# Patient Record
(demographics unavailable — no encounter records)

---

## 2025-01-28 NOTE — REASON FOR VISIT
[TextEntry] : 37-year-old female who presents for urinary frequency and stress urinary incontinence  Patient reports that the stress urinary incontinence began after her second vaginal delivery.  She has leakage with coughing and sneezing.  She also reports urinary frequency every 30 to 60 minutes.  She does hydrate during the day but feels this is too frequent.  She has no nocturia.  She endorses pressure and discomfort prompting her to void.  She denies urgency and urge incontinence.  She denies straining to empty her bladder.  She denies gross hematuria.  She denies any urologic history.  She does occasionally get a right flank pain and is interested in an ultrasound.  She also reports that she has had 2 UTIs since her deliveries.  Her symptoms include dysuria, frequency urgency and sensation of difficulty emptying.  She is a history of 2 vaginal deliveries with a forceps for the first.  She denies other pelvic surgeries.  She denies vaginal bulge.  She denies dryness.  She is not on birth control.  She has regular menses.  She reports constipation that is currently managed with metformin.  She was taking senna as needed previously.  If she is not on anything, she will have bowel movements once per week.  She is on metformin for weight loss.  She denies diabetes She denies other health issues PVR minimal  Outside records reviewed: A1c 5.4% Creatinine 0.6 Urinalysis 0 RBC

## 2025-01-28 NOTE — PHYSICAL EXAM
[Normal Appearance] : normal appearance [Well Groomed] : well groomed [General Appearance - In No Acute Distress] : no acute distress [Abdomen Soft] : soft [Abdomen Tenderness] : non-tender [Normal Station and Gait] : the gait and station were normal for the patient's age [] : no rash [No Focal Deficits] : no focal deficits [Oriented To Time, Place, And Person] : oriented to person, place, and time [Affect] : the affect was normal [Mood] : the mood was normal [de-identified] : +urethral hypermobility, but negative CST supine and standing. no prolapse, nontender pelvic floor [Chaperone Present] : A chaperone was present in the examining room during all aspects of the physical examination [[id number]] : A chaperone was present during the pelvic exam. [FreeTextEntry2] : [facility]

## 2025-01-28 NOTE — ASSESSMENT
[FreeTextEntry1] : 37-year-old female who presents for:  1.  Stress urinary incontinence-patient did not exhibit stress incontinence on exam today.  Discussed that we will repeat this at upcoming visits with her bladder full.  I did offer to fill retrograde with a catheter but she declined.  Discussed options including pelvic floor PT, Bulkamid, mid urethral sling.  At this point she wants to try pelvic floor PT and she was given information regarding the Bulkamid as well as sling.  2.  OAB-patient with urinary frequency and pressure prompting her to void.  She does not have incontinence.  Discussed avoiding bladder irritants, pelvic floor PT, OAB meds.  At this time she wants to try pelvic floor PT for bladder retraining.  3.  Intermittent right flank pain-patient intermittently develops right flank pain.  She is interested in going for renal bladder ultrasound.  This has been sent for her.  4.  UTIs-patient with a history of 2 UTIs since her delivery.  Will send her urine today for urinalysis and urine culture.  Follow-up urine studies Follow-up ultrasound Return to clinic in 2 to 3 months; plan for repeat pelvic exam with a full bladder

## 2025-03-24 NOTE — REASON FOR VISIT
[TextEntry] : 37-year-old female who presents for urinary frequency and stress urinary incontinence  Patient reports that the stress urinary incontinence began after her second vaginal delivery.  She has leakage with coughing and sneezing.  She also reports urinary frequency every 30 to 60 minutes.  She does hydrate during the day but feels this is too frequent.  She has no nocturia.  She endorses pressure and discomfort prompting her to void.  She denies urgency and urge incontinence.  She denies straining to empty her bladder.  She denies gross hematuria.  She denies any urologic history.  She does occasionally get a right flank pain and is interested in an ultrasound.  She also reports that she has had 2 UTIs since her deliveries.  Her symptoms include dysuria, frequency urgency and sensation of difficulty emptying.  She is a history of 2 vaginal deliveries with a forceps for the first.  She denies other pelvic surgeries.  She denies vaginal bulge.  She denies dryness.  She is not on birth control.  She has regular menses.  She reports constipation that is currently managed with metformin.  She was taking senna as needed previously.  If she is not on anything, she will have bowel movements once per week.  She is on metformin for weight loss.  She denies diabetes She denies other health issues PVR minimal  Outside records reviewed: A1c 5.4% Creatinine 0.6 Urinalysis 0 RBC Genitourinary:. +urethral hypermobility, but negative CST supine and standing. no prolapse, nontender pelvic floor. Repeat [facility] exam with full bladder Referred to [facility] with 13cc PVR, normal kidneys  Ucx contaminated